# Patient Record
Sex: FEMALE | Race: BLACK OR AFRICAN AMERICAN | NOT HISPANIC OR LATINO | URBAN - METROPOLITAN AREA
[De-identification: names, ages, dates, MRNs, and addresses within clinical notes are randomized per-mention and may not be internally consistent; named-entity substitution may affect disease eponyms.]

---

## 2023-11-25 ENCOUNTER — EMERGENCY (EMERGENCY)
Facility: HOSPITAL | Age: 43
LOS: 1 days | Discharge: HOME | End: 2023-11-25
Admitting: EMERGENCY MEDICINE
Payer: MEDICAID

## 2023-11-25 VITALS
RESPIRATION RATE: 16 BRPM | HEART RATE: 83 BPM | DIASTOLIC BLOOD PRESSURE: 100 MMHG | TEMPERATURE: 98 F | OXYGEN SATURATION: 100 % | SYSTOLIC BLOOD PRESSURE: 155 MMHG

## 2023-11-25 VITALS
RESPIRATION RATE: 16 BRPM | OXYGEN SATURATION: 100 % | TEMPERATURE: 98 F | HEART RATE: 85 BPM | SYSTOLIC BLOOD PRESSURE: 133 MMHG | DIASTOLIC BLOOD PRESSURE: 92 MMHG

## 2023-11-25 LAB
ALBUMIN SERPL ELPH-MCNC: 4.1 G/DL — SIGNIFICANT CHANGE UP (ref 3.3–5)
ALBUMIN SERPL ELPH-MCNC: 4.1 G/DL — SIGNIFICANT CHANGE UP (ref 3.3–5)
ALP SERPL-CCNC: 63 U/L — SIGNIFICANT CHANGE UP (ref 40–120)
ALP SERPL-CCNC: 63 U/L — SIGNIFICANT CHANGE UP (ref 40–120)
ALT FLD-CCNC: 19 U/L — SIGNIFICANT CHANGE UP (ref 4–33)
ALT FLD-CCNC: 19 U/L — SIGNIFICANT CHANGE UP (ref 4–33)
ANION GAP SERPL CALC-SCNC: 15 MMOL/L — HIGH (ref 7–14)
ANION GAP SERPL CALC-SCNC: 15 MMOL/L — HIGH (ref 7–14)
APPEARANCE UR: CLEAR — SIGNIFICANT CHANGE UP
APPEARANCE UR: CLEAR — SIGNIFICANT CHANGE UP
AST SERPL-CCNC: 22 U/L — SIGNIFICANT CHANGE UP (ref 4–32)
AST SERPL-CCNC: 22 U/L — SIGNIFICANT CHANGE UP (ref 4–32)
BACTERIA # UR AUTO: NEGATIVE /HPF — SIGNIFICANT CHANGE UP
BACTERIA # UR AUTO: NEGATIVE /HPF — SIGNIFICANT CHANGE UP
BASOPHILS # BLD AUTO: 0.03 K/UL — SIGNIFICANT CHANGE UP (ref 0–0.2)
BASOPHILS # BLD AUTO: 0.03 K/UL — SIGNIFICANT CHANGE UP (ref 0–0.2)
BASOPHILS NFR BLD AUTO: 0.3 % — SIGNIFICANT CHANGE UP (ref 0–2)
BASOPHILS NFR BLD AUTO: 0.3 % — SIGNIFICANT CHANGE UP (ref 0–2)
BILIRUB SERPL-MCNC: 0.3 MG/DL — SIGNIFICANT CHANGE UP (ref 0.2–1.2)
BILIRUB SERPL-MCNC: 0.3 MG/DL — SIGNIFICANT CHANGE UP (ref 0.2–1.2)
BILIRUB UR-MCNC: NEGATIVE — SIGNIFICANT CHANGE UP
BILIRUB UR-MCNC: NEGATIVE — SIGNIFICANT CHANGE UP
BLD GP AB SCN SERPL QL: NEGATIVE — SIGNIFICANT CHANGE UP
BLD GP AB SCN SERPL QL: NEGATIVE — SIGNIFICANT CHANGE UP
BUN SERPL-MCNC: 9 MG/DL — SIGNIFICANT CHANGE UP (ref 7–23)
BUN SERPL-MCNC: 9 MG/DL — SIGNIFICANT CHANGE UP (ref 7–23)
CALCIUM SERPL-MCNC: 9.5 MG/DL — SIGNIFICANT CHANGE UP (ref 8.4–10.5)
CALCIUM SERPL-MCNC: 9.5 MG/DL — SIGNIFICANT CHANGE UP (ref 8.4–10.5)
CAST: 0 /LPF — SIGNIFICANT CHANGE UP (ref 0–4)
CAST: 0 /LPF — SIGNIFICANT CHANGE UP (ref 0–4)
CHLORIDE SERPL-SCNC: 100 MMOL/L — SIGNIFICANT CHANGE UP (ref 98–107)
CHLORIDE SERPL-SCNC: 100 MMOL/L — SIGNIFICANT CHANGE UP (ref 98–107)
CO2 SERPL-SCNC: 18 MMOL/L — LOW (ref 22–31)
CO2 SERPL-SCNC: 18 MMOL/L — LOW (ref 22–31)
COLOR SPEC: YELLOW — SIGNIFICANT CHANGE UP
COLOR SPEC: YELLOW — SIGNIFICANT CHANGE UP
CREAT SERPL-MCNC: 0.61 MG/DL — SIGNIFICANT CHANGE UP (ref 0.5–1.3)
CREAT SERPL-MCNC: 0.61 MG/DL — SIGNIFICANT CHANGE UP (ref 0.5–1.3)
DIFF PNL FLD: ABNORMAL
DIFF PNL FLD: ABNORMAL
EGFR: 114 ML/MIN/1.73M2 — SIGNIFICANT CHANGE UP
EGFR: 114 ML/MIN/1.73M2 — SIGNIFICANT CHANGE UP
EOSINOPHIL # BLD AUTO: 0.01 K/UL — SIGNIFICANT CHANGE UP (ref 0–0.5)
EOSINOPHIL # BLD AUTO: 0.01 K/UL — SIGNIFICANT CHANGE UP (ref 0–0.5)
EOSINOPHIL NFR BLD AUTO: 0.1 % — SIGNIFICANT CHANGE UP (ref 0–6)
EOSINOPHIL NFR BLD AUTO: 0.1 % — SIGNIFICANT CHANGE UP (ref 0–6)
GLUCOSE SERPL-MCNC: 99 MG/DL — SIGNIFICANT CHANGE UP (ref 70–99)
GLUCOSE SERPL-MCNC: 99 MG/DL — SIGNIFICANT CHANGE UP (ref 70–99)
GLUCOSE UR QL: NEGATIVE MG/DL — SIGNIFICANT CHANGE UP
GLUCOSE UR QL: NEGATIVE MG/DL — SIGNIFICANT CHANGE UP
HCG SERPL-ACNC: 1556 MIU/ML — SIGNIFICANT CHANGE UP
HCG SERPL-ACNC: 1556 MIU/ML — SIGNIFICANT CHANGE UP
HCT VFR BLD CALC: 43.6 % — SIGNIFICANT CHANGE UP (ref 34.5–45)
HCT VFR BLD CALC: 43.6 % — SIGNIFICANT CHANGE UP (ref 34.5–45)
HGB BLD-MCNC: 14.2 G/DL — SIGNIFICANT CHANGE UP (ref 11.5–15.5)
HGB BLD-MCNC: 14.2 G/DL — SIGNIFICANT CHANGE UP (ref 11.5–15.5)
IANC: 8.39 K/UL — HIGH (ref 1.8–7.4)
IANC: 8.39 K/UL — HIGH (ref 1.8–7.4)
IMM GRANULOCYTES NFR BLD AUTO: 0.4 % — SIGNIFICANT CHANGE UP (ref 0–0.9)
IMM GRANULOCYTES NFR BLD AUTO: 0.4 % — SIGNIFICANT CHANGE UP (ref 0–0.9)
KETONES UR-MCNC: ABNORMAL MG/DL
KETONES UR-MCNC: ABNORMAL MG/DL
LEUKOCYTE ESTERASE UR-ACNC: NEGATIVE — SIGNIFICANT CHANGE UP
LEUKOCYTE ESTERASE UR-ACNC: NEGATIVE — SIGNIFICANT CHANGE UP
LYMPHOCYTES # BLD AUTO: 1.25 K/UL — SIGNIFICANT CHANGE UP (ref 1–3.3)
LYMPHOCYTES # BLD AUTO: 1.25 K/UL — SIGNIFICANT CHANGE UP (ref 1–3.3)
LYMPHOCYTES # BLD AUTO: 12.4 % — LOW (ref 13–44)
LYMPHOCYTES # BLD AUTO: 12.4 % — LOW (ref 13–44)
MCHC RBC-ENTMCNC: 30 PG — SIGNIFICANT CHANGE UP (ref 27–34)
MCHC RBC-ENTMCNC: 30 PG — SIGNIFICANT CHANGE UP (ref 27–34)
MCHC RBC-ENTMCNC: 32.6 GM/DL — SIGNIFICANT CHANGE UP (ref 32–36)
MCHC RBC-ENTMCNC: 32.6 GM/DL — SIGNIFICANT CHANGE UP (ref 32–36)
MCV RBC AUTO: 92 FL — SIGNIFICANT CHANGE UP (ref 80–100)
MCV RBC AUTO: 92 FL — SIGNIFICANT CHANGE UP (ref 80–100)
MONOCYTES # BLD AUTO: 0.37 K/UL — SIGNIFICANT CHANGE UP (ref 0–0.9)
MONOCYTES # BLD AUTO: 0.37 K/UL — SIGNIFICANT CHANGE UP (ref 0–0.9)
MONOCYTES NFR BLD AUTO: 3.7 % — SIGNIFICANT CHANGE UP (ref 2–14)
MONOCYTES NFR BLD AUTO: 3.7 % — SIGNIFICANT CHANGE UP (ref 2–14)
NEUTROPHILS # BLD AUTO: 8.39 K/UL — HIGH (ref 1.8–7.4)
NEUTROPHILS # BLD AUTO: 8.39 K/UL — HIGH (ref 1.8–7.4)
NEUTROPHILS NFR BLD AUTO: 83.1 % — HIGH (ref 43–77)
NEUTROPHILS NFR BLD AUTO: 83.1 % — HIGH (ref 43–77)
NITRITE UR-MCNC: NEGATIVE — SIGNIFICANT CHANGE UP
NITRITE UR-MCNC: NEGATIVE — SIGNIFICANT CHANGE UP
NRBC # BLD: 0 /100 WBCS — SIGNIFICANT CHANGE UP (ref 0–0)
NRBC # BLD: 0 /100 WBCS — SIGNIFICANT CHANGE UP (ref 0–0)
NRBC # FLD: 0 K/UL — SIGNIFICANT CHANGE UP (ref 0–0)
NRBC # FLD: 0 K/UL — SIGNIFICANT CHANGE UP (ref 0–0)
PH UR: 6 — SIGNIFICANT CHANGE UP (ref 5–8)
PH UR: 6 — SIGNIFICANT CHANGE UP (ref 5–8)
PLATELET # BLD AUTO: 414 K/UL — HIGH (ref 150–400)
PLATELET # BLD AUTO: 414 K/UL — HIGH (ref 150–400)
POTASSIUM SERPL-MCNC: 3.9 MMOL/L — SIGNIFICANT CHANGE UP (ref 3.5–5.3)
POTASSIUM SERPL-MCNC: 3.9 MMOL/L — SIGNIFICANT CHANGE UP (ref 3.5–5.3)
POTASSIUM SERPL-SCNC: 3.9 MMOL/L — SIGNIFICANT CHANGE UP (ref 3.5–5.3)
POTASSIUM SERPL-SCNC: 3.9 MMOL/L — SIGNIFICANT CHANGE UP (ref 3.5–5.3)
PROT SERPL-MCNC: 8.3 G/DL — SIGNIFICANT CHANGE UP (ref 6–8.3)
PROT SERPL-MCNC: 8.3 G/DL — SIGNIFICANT CHANGE UP (ref 6–8.3)
PROT UR-MCNC: 30 MG/DL
PROT UR-MCNC: 30 MG/DL
RBC # BLD: 4.74 M/UL — SIGNIFICANT CHANGE UP (ref 3.8–5.2)
RBC # BLD: 4.74 M/UL — SIGNIFICANT CHANGE UP (ref 3.8–5.2)
RBC # FLD: 14.3 % — SIGNIFICANT CHANGE UP (ref 10.3–14.5)
RBC # FLD: 14.3 % — SIGNIFICANT CHANGE UP (ref 10.3–14.5)
RBC CASTS # UR COMP ASSIST: 2 /HPF — SIGNIFICANT CHANGE UP (ref 0–4)
RBC CASTS # UR COMP ASSIST: 2 /HPF — SIGNIFICANT CHANGE UP (ref 0–4)
RH IG SCN BLD-IMP: POSITIVE — SIGNIFICANT CHANGE UP
RH IG SCN BLD-IMP: POSITIVE — SIGNIFICANT CHANGE UP
SODIUM SERPL-SCNC: 133 MMOL/L — LOW (ref 135–145)
SODIUM SERPL-SCNC: 133 MMOL/L — LOW (ref 135–145)
SP GR SPEC: 1.03 — HIGH (ref 1–1.03)
SP GR SPEC: 1.03 — HIGH (ref 1–1.03)
SQUAMOUS # UR AUTO: 1 /HPF — SIGNIFICANT CHANGE UP (ref 0–5)
SQUAMOUS # UR AUTO: 1 /HPF — SIGNIFICANT CHANGE UP (ref 0–5)
UROBILINOGEN FLD QL: 1 MG/DL — SIGNIFICANT CHANGE UP (ref 0.2–1)
UROBILINOGEN FLD QL: 1 MG/DL — SIGNIFICANT CHANGE UP (ref 0.2–1)
WBC # BLD: 10.09 K/UL — SIGNIFICANT CHANGE UP (ref 3.8–10.5)
WBC # BLD: 10.09 K/UL — SIGNIFICANT CHANGE UP (ref 3.8–10.5)
WBC # FLD AUTO: 10.09 K/UL — SIGNIFICANT CHANGE UP (ref 3.8–10.5)
WBC # FLD AUTO: 10.09 K/UL — SIGNIFICANT CHANGE UP (ref 3.8–10.5)
WBC UR QL: 0 /HPF — SIGNIFICANT CHANGE UP (ref 0–5)
WBC UR QL: 0 /HPF — SIGNIFICANT CHANGE UP (ref 0–5)

## 2023-11-25 PROCEDURE — 99285 EMERGENCY DEPT VISIT HI MDM: CPT

## 2023-11-25 PROCEDURE — 76817 TRANSVAGINAL US OBSTETRIC: CPT | Mod: 26

## 2023-11-25 RX ORDER — ACETAMINOPHEN 500 MG
650 TABLET ORAL ONCE
Refills: 0 | Status: COMPLETED | OUTPATIENT
Start: 2023-11-25 | End: 2023-11-25

## 2023-11-25 RX ORDER — SODIUM CHLORIDE 9 MG/ML
1000 INJECTION INTRAMUSCULAR; INTRAVENOUS; SUBCUTANEOUS ONCE
Refills: 0 | Status: DISCONTINUED | OUTPATIENT
Start: 2023-11-25 | End: 2023-11-25

## 2023-11-25 NOTE — CONSULT NOTE ADULT - ASSESSMENT
Patient is a 42y  at 11w5d by LMP  presents with vaginal bleeding for the past 4 days.  Confirmed via review of outpatient records, previously documented IUP with +FH.  Patient hemodynamically and clinically stable.  On TVUS today, no GS noted with no YS/FP/FH.  On exam, patient with clotting at os without active bleeding and mild tenderness.   Findings consistent with early pregnancy loss.    Recommendations:  - Patient counseled on diagnosis of likely early pregnancy loss.  Patient expressed understanding, support provided   - Patient to follow up outpatient with primary OBGYN's office ([facility]) within 1 week.    - Rh type: O+  (No need for rhogam at this time.)  - Patient counseled on strict return precautions including heavy vaginal bleeding >1 pad/hour, severe abdominal pain refractory to pain medications, inability to tolerate PO with nausea/vomiting, chest pain, shortness of breath, lightheadedness, dizziness, fevers, or chills.  Patient expressed understanding     -patient stable for d/c home from GYN perspective.  Primary management per ED team.      d/w Dr. Cyndi Mason PGY2

## 2023-11-25 NOTE — ED PROVIDER NOTE - PHYSICAL EXAMINATION
Bilateral LE:  5/5 strength; sensation intact to light touch; no LE edema; no calf tenderness; no palpable cords

## 2023-11-25 NOTE — ED PROVIDER NOTE - PATIENT PORTAL LINK FT
You can access the FollowMyHealth Patient Portal offered by [facility] by registering at the following website: [email or web address]. By joining [facility]’s FollowMyHealth portal, you will also be able to view your health information using other applications (apps) compatible with our system.

## 2023-11-25 NOTE — ED PROVIDER NOTE - CLINICAL SUMMARY MEDICAL DECISION MAKING FREE TEXT BOX
Patient is a 42-year-old female G5, P4 LMP 2023 with past medical history of  presents with vaginal bleeding x 4 days.  Patient reports she developed vaginal spotting associated with cramping lower back pain.  She states she was evaluated at OB/GYN office at onset of symptoms, where she had an ultrasound.  Patient reports IUP with fetal heartbeat was identified at that time.  Patient reports since last night, vaginal bleeding and pain more intense.  Patient reports has saturated 4 pads so far today.  Patient reports moderate intensity pelvic and lower back cramping, which patient reports is identical to what she has had contractions in the past.  She denies any fevers, chills, syncope, dysuria, cloudy urine, vaginal discharge, nausea, vomiting, diarrhea, constipation, difficulty voiding, difficulty passing bowel movements, fecal/urinary incontinence, trauma, falls, illicit drug use, ETOH abuse, h/o malignancy.  This is a patient with vaginal bleeding in pregnancy; consider possible threatened miscarriage, possible ectopic pregnancy.  Plan to order labs, hCG, type and screen, transvaginal ultrasound.  Disposition pending workup.

## 2023-11-25 NOTE — ED ADULT TRIAGE NOTE - CHIEF COMPLAINT QUOTE
pt 6 weeks pregnant c/o vaginal bleeding, intense pelvic and lower back pain since last night, worsening today. denies med hx

## 2023-11-25 NOTE — CONSULT NOTE ADULT - SUBJECTIVE AND OBJECTIVE BOX
GYN Consult Note    42y  at 11w5d by LMP  presents with vaginal bleeding for the past 4 days, heavier last night.  States she had +HPT on  and began noticing spotting on , which prompted her to make an appointment with her OBGYN.  Patient states that at that visit she had a sono performed showing IUP with +FH.  Reports that last night she began having heavier bleeding and cramping.  Reports today she soaked 4 pads in 3 hours with passage of some clots (patient reports she did not look at or examine clots, so could not describe their size - just that she felt passage of clots into the toilet).  States that since that episode of cramping and passage of clots earlier she feels much better.  Reports bleeding has now slowed to light spotting, and she has no more cramping.  Denies firther complaints at this time.      OB/GYN HISTORY:   Physician: [facility]  Ob: c/s x1 (),  x3 (, , )  Gyn: Denies fibroids, cysts, PCOS, endometriosis, or history of genital lesions   PMH: Denies    PSH: C/Sx1  Meds: PNV  Allergies: NKDA      Vital Signs Last 24 Hrs  T(C): 37 (2023 17:51), Max: 37 (2023 17:51)  T(F): 98.6 (2023 17:51), Max: 98.6 (2023 17:51)  HR: 87 (2023 17:51) (83 - 87)  BP: 135/93 (2023 17:51) (135/93 - 155/100)  BP(mean): --  RR: 17 (2023 17:51) (16 - 17)  SpO2: 99% (2023 17:51) (99% - 100%)    Parameters below as of 2023 17:51  Patient On (Oxygen Delivery Method): room air      PHYSICAL EXAM:   Gen: NAD, alert and oriented x 3  Cardiovascular: RRR  Respiratory: breathing comfortably on RA  Abd: soft, non tender, non-distended  Pelvic: 1cm dilated/long, Uterus: approx 8wk size, mildly tender to deep palpation  Adnexa: non tender, no palpable masses  Speculum Exam: Small clotting noted at os with minimal bleeding around clot, no heavy bleeding;  Extremities: non-tender b/l, no edema   Skin: warm and well perfused  *Pelvic exam performed with chaperone present: SANDY Dexter    LABS:                        14.2   10.09 )-----------( 414      ( 2023 16:45 )             43.6         133<L>  |  100  |  9   ----------------------------<  99  3.9   |  18<L>  |  0.61    Ca    9.5      2023 16:45    TPro  8.3  /  Alb  4.1  /  TBili  0.3  /  DBili  x   /  AST  22  /  ALT  19  /  AlkPhos  63        Urinalysis Basic - ( 2023 18:30 )    Color: Yellow / Appearance: Clear / S.035 / pH: x  Gluc: x / Ketone: Trace mg/dL  / Bili: Negative / Urobili: 1.0 mg/dL   Blood: x / Protein: 30 mg/dL / Nitrite: Negative   Leuk Esterase: Negative / RBC: 2 /HPF / WBC 0 /HPF   Sq Epi: x / Non Sq Epi: 1 /HPF / Bacteria: Negative /HPF    HC: 1556    RADIOLOGY & ADDITIONAL STUDIES:  ACC: [id number] EXAM:  US OB TRANSVAGINAL   ORDERED BY: FRANSISCO QUIGLEY     PROCEDURE DATE:  2023          INTERPRETATION:  CLINICAL INFORMATION: First trimester pregnancy. Vaginal   bleeding.    LMP: 2023    Estimated Gestational Age by LMP: 11 weeks and 5 days    COMPARISON: None available.    Endovaginal pelvic sonogram only.    FINDINGS:  Uterus: There is small elongated cystic structure in the endometrium   which may represent abnormal gestational sac. No yolk sac or fetal pole   identified. There is distention of the cervical canal millimeters   segment. Heterogeneous tissue likely representing hematoma.    Gestational Sac Size (mean): 4.5 mm  Gestational Sac Shape : Abnormal.  Crown Rump Length: Not visualized.  Estimated Gestational Age: 6 weeks.  Yolk Sac: Not visualized.    Right ovary: 3.3 x 2.8 x 2.5 cm. There is 2.2 cm right ovarian corpus   luteum cyst.  Left ovary: Not visualized.    Fluid: None.    IMPRESSION:  Abnormal appearing gestational sac with associated large clot in the   lower uterine segment and cervix suggestive of abnormal pregnancy.   Neither fetal pole yolk sac is identified.  Estimated gestational age of 6 weeks.    Findings suspicious for, but not diagnostic of pregnancy failure (absence   of embryo 6 or more weeks after last menstrual period).        --- End of Report ---            BRADEN BRAVO MD; Attending Radiologist  This document has been electronically signed. 2023  5:58PM

## 2023-11-25 NOTE — ED PROVIDER NOTE - NSFOLLOWUPINSTRUCTIONS_ED_ALL_ED_FT
Advance activity as tolerated.  Continue all previously prescribed medications as directed unless otherwise instructed.  Practice pelvic rest -- no sexual intercourse, no foreign bodies, no tampons in vagina.  Follow up with your primary care physician and OB/GYN (referral list provided or call [phone] to make an appointment with the OB/GYN clinic)  in 48-72 hours- bring copies of your results.  Return to the ER for worsening or persistent symptoms, including but not limited to worsening/persistent pain, heavy vaginal bleeding requiring more than or equal to 2 pads in 1 hour, lightheadedness, passing out, chest pain, shortness of breath, and/or ANY NEW OR CONCERNING SYMPTOMS. If you have issues obtaining follow up, please call: [phone] to obtain a doctor or specialist who takes your insurance in your area.  You may call [phone] to make an appointment with the internal medicine clinic.     THREATENED MISCARRIAGE - General Information    Threatened Miscarriage    WHAT YOU NEED TO KNOW:    What is a threatened miscarriage? A threatened miscarriage occurs when you have vaginal bleeding within the first 20 weeks of pregnancy. It means that a miscarriage may happen. A threatened miscarriage may also be called a threatened .    What causes bleeding or spotting during pregnancy? The cause of your bleeding or spotting may not be known. The following are possible causes of vaginal bleeding during pregnancy:    Polyps, fibroids, or cysts in the uterus    Sexual intercourse    Infection    Where or how the placenta is attached to your uterus (womb)    A problem with your fetus (unborn baby)    Drug or alcohol use    Ectopic pregnancy (the fetus is growing outside of the uterus)  What are the signs and symptoms of a threatened miscarriage?    Vaginal spotting or bleeding    Pain or cramping in your abdomen or lower back  How is a threatened miscarriage diagnosed? Tell your healthcare provider when your bleeding started. You may need any of the following:    Blood tests may show infection, check your level of pregnancy hormone, or give information about your overall health.    A pelvic exam checks the size of your uterus. A pelvic exam also checks your cervix for dilation (opening).    A pelvic ultrasound shows pictures of the fetus and finds his or her heartbeat. A pelvic ultrasound also looks at your reproductive organs and monitors the amount of bleeding.  How is a threatened miscarriage managed? The following may help you manage your symptoms and decrease your risk for a miscarriage:    Do not put anything in your vagina. Do not have sex, douche, or use tampons. These actions may increase your risk for infection and miscarriage.    Rest as directed. Do not exercise or do strenuous activities. These activities may cause  labor or miscarriage. Ask your healthcare provider what activities are okay to do.  When should I seek immediate care?    You feel weak or faint.    Your pain or cramping in your abdomen or back gets worse.    You have vaginal bleeding that soaks 1 or more pads in an hour.    You pass material that looks like tissue or large clots.  When should I call my doctor?    You have a fever.    You have trouble urinating, burning when you urinate, or feel a need to urinate often.    You have new or worsening vaginal bleeding.    You have vaginal pain or itching, or vaginal discharge that is yellow, green, or foul-smelling.    You have questions or concerns about your condition or care.  CARE AGREEMENT:    You have the right to help plan your care. Learn about your health condition and how it may be treated. Discuss treatment options with your healthcare providers to decide what care you want to receive. You always have the right to refuse treatment.    © [employer] 2023     PELVIC REST - AfterCare(R) Instructions(ER/ED)    Pelvic Rest    WHAT YOU NEED TO KNOW:    Pelvic rest is when you do not put anything in your vagina. This includes tampons, douching, or sexual activity. You may need to avoid lifting heavy objects. Your healthcare provider will tell you how long you need pelvic rest.    DISCHARGE INSTRUCTIONS:    Return to the emergency department if:    You have sudden, heavy bleeding from your vagina.    You have severe pain.  Contact your healthcare provider if:    You have discharge from your vagina that smells bad.    You have a fever.    You have questions or concerns about your condition or care.  Follow up with your doctor as directed: Write down your questions so you remember to ask them during your visits.    © [employer] 2023

## 2023-11-25 NOTE — ED ADULT NURSE NOTE - NSFALLUNIVINTERV_ED_ALL_ED
Bed/Stretcher in lowest position, wheels locked, appropriate side rails in place/Call bell, personal items and telephone in reach/Instruct patient to call for assistance before getting out of bed/chair/stretcher/Non-slip footwear applied when patient is off stretcher/[place] to call system/Physically safe environment - no spills, clutter or unnecessary equipment/Purposeful proactive rounding/Room/bathroom lighting operational, light cord in reach

## 2023-11-26 LAB
CULTURE RESULTS: SIGNIFICANT CHANGE UP
CULTURE RESULTS: SIGNIFICANT CHANGE UP
SPECIMEN SOURCE: SIGNIFICANT CHANGE UP
SPECIMEN SOURCE: SIGNIFICANT CHANGE UP

## 2024-12-03 NOTE — ED PROVIDER NOTE - PROGRESS NOTE DETAILS
DIANA QUIGLEY:  OB/GYN recommends discharge and follow up in 1 week with OB/GYN.  Pt medically stable for discharge. Strict return precautions given.  Pt to follow up with PMD and OB/GYN.
Negative